# Patient Record
Sex: MALE | Race: ASIAN | Employment: FULL TIME | URBAN - METROPOLITAN AREA
[De-identification: names, ages, dates, MRNs, and addresses within clinical notes are randomized per-mention and may not be internally consistent; named-entity substitution may affect disease eponyms.]

---

## 2020-02-17 ENCOUNTER — OFFICE VISIT (OUTPATIENT)
Dept: HEMATOLOGY | Age: 55
End: 2020-02-17

## 2020-02-17 ENCOUNTER — HOSPITAL ENCOUNTER (OUTPATIENT)
Dept: LAB | Age: 55
Discharge: HOME | End: 2020-02-17
Payer: OTHER GOVERNMENT

## 2020-02-17 VITALS
WEIGHT: 184.8 LBS | SYSTOLIC BLOOD PRESSURE: 127 MMHG | DIASTOLIC BLOOD PRESSURE: 89 MMHG | OXYGEN SATURATION: 99 % | TEMPERATURE: 98 F | HEART RATE: 67 BPM | RESPIRATION RATE: 18 BRPM | BODY MASS INDEX: 27.37 KG/M2 | HEIGHT: 69 IN

## 2020-02-17 DIAGNOSIS — K76.0 FATTY LIVER: ICD-10-CM

## 2020-02-17 DIAGNOSIS — K76.0 FATTY LIVER: Primary | ICD-10-CM

## 2020-02-17 PROBLEM — G62.9 NEUROPATHY: Status: ACTIVE | Noted: 2020-02-17

## 2020-02-17 PROBLEM — E78.00 HYPERCHOLESTEROLEMIA: Status: ACTIVE | Noted: 2020-02-17

## 2020-02-17 PROBLEM — E11.9 DIABETES MELLITUS TYPE 2, CONTROLLED (HCC): Status: ACTIVE | Noted: 2020-02-17

## 2020-02-17 PROBLEM — I51.7 CARDIOMEGALY: Status: ACTIVE | Noted: 2020-02-17

## 2020-02-17 PROBLEM — M45.9 ANKYLOSING SPONDYLITIS (HCC): Status: ACTIVE | Noted: 2020-02-17

## 2020-02-17 LAB
ALBUMIN SERPL-MCNC: 4.4 G/DL (ref 3.4–5)
ALBUMIN/GLOB SERPL: 1 {RATIO} (ref 0.8–1.7)
ALP SERPL-CCNC: 38 U/L (ref 45–117)
ALT SERPL-CCNC: 75 U/L (ref 16–61)
ANION GAP SERPL CALC-SCNC: 3 MMOL/L (ref 3–18)
AST SERPL-CCNC: 46 U/L (ref 10–38)
BASOPHILS # BLD: 0.1 K/UL (ref 0–0.1)
BASOPHILS NFR BLD: 2 % (ref 0–2)
BILIRUB DIRECT SERPL-MCNC: 0.1 MG/DL (ref 0–0.2)
BILIRUB SERPL-MCNC: 0.4 MG/DL (ref 0.2–1)
BUN SERPL-MCNC: 19 MG/DL (ref 7–18)
BUN/CREAT SERPL: 16 (ref 12–20)
CALCIUM SERPL-MCNC: 9.6 MG/DL (ref 8.5–10.1)
CHLORIDE SERPL-SCNC: 104 MMOL/L (ref 100–111)
CO2 SERPL-SCNC: 33 MMOL/L (ref 21–32)
CREAT SERPL-MCNC: 1.18 MG/DL (ref 0.6–1.3)
DIFFERENTIAL METHOD BLD: ABNORMAL
EOSINOPHIL # BLD: 0.3 K/UL (ref 0–0.4)
EOSINOPHIL NFR BLD: 5 % (ref 0–5)
ERYTHROCYTE [DISTWIDTH] IN BLOOD BY AUTOMATED COUNT: 13.3 % (ref 11.6–14.5)
FERRITIN SERPL-MCNC: 141 NG/ML (ref 8–388)
GLOBULIN SER CALC-MCNC: 4.2 G/DL (ref 2–4)
GLUCOSE SERPL-MCNC: 74 MG/DL (ref 74–99)
HCT VFR BLD AUTO: 47.3 % (ref 36–48)
HGB BLD-MCNC: 15.9 G/DL (ref 13–16)
INR PPP: 1 (ref 0.8–1.2)
IRON SATN MFR SERPL: 29 % (ref 20–50)
IRON SERPL-MCNC: 116 UG/DL (ref 50–175)
LYMPHOCYTES # BLD: 2.7 K/UL (ref 0.9–3.6)
LYMPHOCYTES NFR BLD: 48 % (ref 21–52)
MCH RBC QN AUTO: 29.9 PG (ref 24–34)
MCHC RBC AUTO-ENTMCNC: 33.6 G/DL (ref 31–37)
MCV RBC AUTO: 88.9 FL (ref 74–97)
MONOCYTES # BLD: 0.3 K/UL (ref 0.05–1.2)
MONOCYTES NFR BLD: 6 % (ref 3–10)
NEUTS SEG # BLD: 2.2 K/UL (ref 1.8–8)
NEUTS SEG NFR BLD: 39 % (ref 40–73)
PLATELET # BLD AUTO: 286 K/UL (ref 135–420)
PMV BLD AUTO: 9.9 FL (ref 9.2–11.8)
POTASSIUM SERPL-SCNC: 4.2 MMOL/L (ref 3.5–5.5)
PROT SERPL-MCNC: 8.6 G/DL (ref 6.4–8.2)
PROTHROMBIN TIME: 12.9 SEC (ref 11.5–15.2)
RBC # BLD AUTO: 5.32 M/UL (ref 4.7–5.5)
SODIUM SERPL-SCNC: 140 MMOL/L (ref 136–145)
TIBC SERPL-MCNC: 394 UG/DL (ref 250–450)
WBC # BLD AUTO: 5.5 K/UL (ref 4.6–13.2)

## 2020-02-17 PROCEDURE — 86708 HEPATITIS A ANTIBODY: CPT

## 2020-02-17 PROCEDURE — 80076 HEPATIC FUNCTION PANEL: CPT

## 2020-02-17 PROCEDURE — 36415 COLL VENOUS BLD VENIPUNCTURE: CPT

## 2020-02-17 PROCEDURE — 85025 COMPLETE CBC W/AUTO DIFF WBC: CPT

## 2020-02-17 PROCEDURE — 86706 HEP B SURFACE ANTIBODY: CPT

## 2020-02-17 PROCEDURE — 86704 HEP B CORE ANTIBODY TOTAL: CPT

## 2020-02-17 PROCEDURE — 80048 BASIC METABOLIC PNL TOTAL CA: CPT

## 2020-02-17 PROCEDURE — 87340 HEPATITIS B SURFACE AG IA: CPT

## 2020-02-17 PROCEDURE — 83540 ASSAY OF IRON: CPT

## 2020-02-17 PROCEDURE — 85610 PROTHROMBIN TIME: CPT

## 2020-02-17 PROCEDURE — 82728 ASSAY OF FERRITIN: CPT

## 2020-02-17 PROCEDURE — 86803 HEPATITIS C AB TEST: CPT

## 2020-02-17 PROCEDURE — 82107 ALPHA-FETOPROTEIN L3: CPT

## 2020-02-17 RX ORDER — GABAPENTIN 600 MG/1
600 TABLET ORAL 2 TIMES DAILY
COMMUNITY

## 2020-02-17 RX ORDER — FENOFIBRATE 150 MG/1
145 CAPSULE ORAL
COMMUNITY

## 2020-02-17 NOTE — PROGRESS NOTES
[address] [facility] MD Mccall Derwood Sorrel, MD Garlan Mattock, PA-C Louetta Peace, [facility]     Latasha Aguilar, [facility]   Juan J Joseph BEVERLY-RICARDA Wang, [facility]       Ramona Deputado Galile[address] 136    at [facility]    [address], [address] [facility]    [address] [facility] 22.    [phone]    FAX: [address], [address]    [phone]    FAX: [phone]         Patient Care Team:  Ngozi Rodriguez DO as PCP - General (Internal Medicine)      Problem List  Date Reviewed: 2/17/2020          Codes Class Noted    Fatty liver ICD-10-CM: K76.0  ICD-9-CM: 571.8  2/17/2020        Hypercholesterolemia ICD-10-CM: E78.00  ICD-9-CM: 272.0  2/17/2020        Diabetes mellitus type 2, controlled ([facility] ICD-10-CM: E11.9  ICD-9-CM: 250.00  2/17/2020        Cardiomegaly ICD-10-CM: I51.7  ICD-9-CM: 429.3  2/17/2020        Neuropathy ICD-10-CM: G62.9  ICD-9-CM: 355.9  2/17/2020        Ankylosing spondylitis [facility]) ICD-10-CM: M45.9  ICD-9-CM: 720.0  2/17/2020                The clinicians listed above have asked me to see Alexi Berlin in consultation regarding suspected fatty liver disease and its management. All medical records sent by the referring physicians were reviewed including imaging studies. The patient is a 53 y.o.  male who is suspected to have fatty liver disease based upon MRI. The most recent laboratory studies are not available. Serologic evaluation for markers of chronic liver disease has either not been performed or the results are not available. The most recent imaging of the liver was MRI performed in 11/2019. Results suggest fatty liver disease. No liver mass lesions noted.     An assessment of liver fibrosis with biopsy or elastography has not been performed. The patient has no symptoms which can be attributed to the liver disorder. The patient completes all daily activities without any functional limitations. The patient has not experienced fatigue, fevers, chills, shortness of breath, chest pain, pain in the right side over the liver, diffuse abdominal pain, nausea, vomiting, constipation, diarrhrea, dry eyes, dry mouth, arthralgias, myalgias, yellowing of the eyes or skin, itching, dark urine, problems concentrating, swelling of the abdomen, swelling of the lower extremities, hematemesis, or hematochezia. ASSESSMENT AND PLAN:  Fatty liver  The diagnosis is based upon imaging and features of metabolic syndrome. The histologic severity has not been defined. Will perform laboratory testing to monitor liver function and degree of liver injury. Serologic testing for causes of chronic liver disease were ordered. Only a liver biopsy can confirm if the patient does have fatty liver and differentiate NAFL from VALENTINE. The treatment is the same: weight reduction. The need to perform a liver biopsy to help determine the cause and severity of the liver test abnormalities was discussed. The risks of performing the liver biopsy including pain, puncture of the lung, gallbladder, intestine or kidney and bleeding were discussed. Will defer liver biopsy for now. Liver chemistries will be monitored. If the liver enzymes remain persistently elevated over the next 1-2 years a liver biopsy should be performed to ensure there is no ongoing chronic liver disease. If the patient looses 20% of current body weight all steatosis will have resolved. Once all steatosis has resolved all inflammation will resolve.   Then all fibrosis will gradually resolve and the liver could eventually be normal.    There is currently no FDA approved medical treatment for fatty liver, NALFD or VALENTINE. The only medical treatments for VALENTINE are though clinical trials. The patient would be a good candidate for enrollment into a clinical trial if found to have VALENTINE. The need to perform an assessment of liver fibrosis was discussed with the patient. Fibroscan can assess liver fibrosis and determine if a patient has advanced fibrosis or cirrhosis without the need for liver biopsy. This can be performed in the office at his follow up visit in 4 weeks. The Fibroscan can be repeated annually or as often as clinically indicated to assess for progression or regression of fibrosis. If liver enzymes do not return to normal with weight reduction then additional evaluation including liver biopsy may be necessary to determine if the elevated liver enzymes is due to another etiology. Counseling for diet and weight loss in patients with confirmed or suspected NAFLD  The patient was counseled regarding diet and exercise to achieve weight loss. The best diet for patients with fatty liver is one very low in carbohydrates and enriched with protein such as an Jalyn's program.      The patient was told not to consume any food products and drinks containing fructose as this enhances hepatic fat synthesis. There is no medication or vitamin supplements that we advocate for VALENTINE. Using glitazones in patients without diabetes mellitus has been shown to reduce fat content in the liver but has no effect on fibrosis and is associated with weight gain. Vitamin E has also been used but the data is not very good and most experts no longer advocate this. Screening for Hepatocellular Carcinoma  HCC screening is not necessary if the patient has no evidence of cirrhosis. AFP was ordered today.       Treatment of other medical problems in patients with chronic liver disease  There are no contraindications for the patient to take most medications that are necessary for treatment of other medical issues. The patient does not comsume alcohol on a daily basis. Normal doses of acetaminophen, as recommended on the label of the bottle, are not hepatotoxic except in the setting of daily alcohol use, even in patients with cirrhosis and can be utilized for pain. Counseling for alcohol in patients with chronic liver disease  The patient was counseled regarding alcohol consumption and the effect of alcohol on chronic liver disease. The patient does not consume any significant amount of alcohol. Vaccinations   The need for vaccination against viral hepatitis A and B will be assessed with serologic and instituted as appropriate. Routine vaccinations against other bacterial and viral agents can be performed as indicated. Annual flu vaccination should be administered if indicated. ALLERGIES  No Known Allergies    MEDICATIONS  Current Outpatient Medications   Medication Sig    inFLIXimab-abda (RENFLEXIS) 100 mg solr injection by IntraVENous route.  sitagliptin phos/metformin HCl (JANUMET PO) Take  by mouth.  atorvastatin calcium (LIPITOR PO) Take 80 mg by mouth daily.  glimepiride (AMARYL PO) Take  by mouth.  gabapentin (NEURONTIN) 600 mg tablet Take 600 mg by mouth two (2) times a day.  topiramate (TOPAMAX PO) Take  by mouth.  rizatriptan benzoate (MAXALT PO) Take  by mouth.  docosahexanoic acid/epa (FISH OIL PO) Take  by mouth.  calcium carbonate (CALCIUM 600 PO) Take  by mouth.  cholecalciferol, vitamin D3, (VITAMIN D3 PO) Take  by mouth.  Fenofibrate 150 mg cap Take 145 mg by mouth. No current facility-administered medications for this visit. SYSTEM REVIEW NOT RELATED TO LIVER DISEASE OR REVIEWED ABOVE:  Constitution systems: Negative for fever, chills, weight gain, weight loss. Eyes: Negative for visual changes. ENT: Negative for sore throat, painful swallowing. Respiratory: Negative for cough, hemoptysis, SOB.    Cardiology: Negative for chest pain, palpitations. GI:  Negative for constipation or diarrhea. : Negative for urinary frequency, dysuria, hematuria, nocturia. Skin: Negative for rash. Hematology: Negative for easy bruising, blood clots. Musculo-skelatal: Negative for back pain, muscle pain, weakness. Neurologic: Negative for headaches, dizziness, vertigo, memory problems not related to HE. Psychology: Negative for anxiety, depression. FAMILY HISTORY:  The father  of aneurism at age 76. The mother has the following chronic disease(s): DM, AD, hypercholesterolemia. There is no family history of liver disease. There is no family history of immune disorders. SOCIAL HISTORY:  The patient is . The patient has 2 children. The patient currently smokes 1 cigar weekly. The patient consumes alcohol on social occasions never in excess. The patient currently works full time as naval . PHYSICAL EXAMINATION:  VS: per nursing note  General: No acute distress. Eyes: Sclera anicteric. ENT: No oral lesions. Thyroid normal.  Nodes: No adenopathy. Skin: No spider angiomata. No jaundice. No palmar erythema. Respiratory: Lungs clear to auscultation. Cardiovascular: Regular heart rate. No murmurs. No JVD. Abdomen: Soft non-tender, liver size normal to percussion/palpation. Spleen not palpable. No obvious ascites. Extremities: No edema. No muscle wasting. No gross arthritic changes. Neurologic: Alert and oriented. Cranial nerves grossly intact. No asterixis. LABORATORY STUDIES:  Recent liver function panel, CBC with platelet count and BMP are not available. These studies will be performed. SEROLOGIES:  Not available or performed. Testing was performed today. LIVER HISTOLOGY:  Not available or performed    ENDOSCOPIC PROCEDURES:  Not available or performed    RADIOLOGY:  2019. [facility] the abdomen ([facility]). Consistent with fatty liver. No hepatic masses. OTHER TESTING:  Not available or performed    FOLLOW-UP:  All of the issues listed above in the Assessment and Plan were discussed with the patient. All questions were answered. The patient expressed a clear understanding of the above. [address] in 4 weeks for Fibroscan and to review all data and determine the treatment plan.       Raudel Herman, SADNORP-C  Liver [place] of [facility]  [address] [facility], [address] [place] [address], [address] [place] [address]   [phone]

## 2020-02-18 LAB
AFP L3 MFR SERPL: NORMAL % (ref 0–9.9)
AFP SERPL-MCNC: 2.3 NG/ML (ref 0–8)
COMMENT, 144067: NORMAL
HAV AB SER QL IA: POSITIVE
HBV CORE AB SERPL QL IA: NEGATIVE
HBV SURFACE AB SER QL IA: NEGATIVE
HBV SURFACE AB SERPL IA-ACNC: <3.1 MIU/ML
HBV SURFACE AG SER QL: <0.1 INDEX
HBV SURFACE AG SER QL: NEGATIVE
HCV AB S/CO SERPL IA: <0.1 S/CO RATIO (ref 0–0.9)
HEP BS AB COMMENT,HBSAC: ABNORMAL

## 2020-03-21 ENCOUNTER — NURSE TRIAGE (OUTPATIENT)
Dept: OTHER | Facility: CLINIC | Age: 55
End: 2020-03-21

## 2020-03-21 NOTE — TELEPHONE ENCOUNTER
Reason for Disposition   Health Information question, no triage required and triager able to answer question    Protocols used: INFORMATION ONLY CALL-ADULT-    Caller is wanting to know if he should reschedule his appointment for 3/24. Caller does report a mild scratchy throat, denies any other symptoms. Caller is going to [facility] back on Monday to make sure he is symptom free before coming into the office.

## 2020-08-31 ENCOUNTER — VIRTUAL VISIT (OUTPATIENT)
Dept: HEMATOLOGY | Age: 55
End: 2020-08-31

## 2020-08-31 DIAGNOSIS — R74.8 ELEVATED LIVER ENZYMES: Primary | ICD-10-CM

## 2020-08-31 NOTE — PROGRESS NOTES
VIRTUAL TELEHEALTH VISIT PERFORMED DUE TO COVID-19 EPIDEMIC    CONSENT:  Kecia Vega, who was seen by synchronous, real-time, audio-video technology, and/or his healthcare decision maker, is aware that this patient-initiated, Telehealth encounter on 8/31/2020 is a billable service, with coverage as determined by his insurance carrier. He is aware that he may receive a bill and has provided verbal consent to proceed. This patient was evaluated during a Virtual Telehealth visit. A caregiver was present if appropriate.  Due to this being a TeleHealth encounter performed during the WURED-67 public health emergency, the physical examination was limited to that listed in the [address] [place] [address], Maikel CARLSON, Nabeel Hagen, MD Michelle Clark, PACH[place]AN [place], [facility] [address] Jeff, [facility]   Sundar Kee, [facility] [address], [facility]       Ramona [facility] 136    at [address], [address] [place] [address], [facility] 22.    [phone]    FAX: [phone] [facility]    at [address], [address]    [phone]    FAX: [phone]         Patient Care Team:  Jewell Hicks DO as PCP - General (Internal Medicine)      Problem List  Date Reviewed: 2/17/2020          Codes Class Noted    Fatty liver ICD-10-CM: K76.0  ICD-9-CM: 571.8  2/17/2020        Hypercholesterolemia ICD-10-CM: E78.00  ICD-9-CM: 272.0  2/17/2020        Diabetes mellitus type 2, controlled ([facility] ICD-10-CM: E11.9  ICD-9-CM: 250.00  2/17/2020        Cardiomegaly ICD-10-CM: I51.7  ICD-9-CM: 429.3  2/17/2020        Neuropathy ICD-10-CM: G62.9  ICD-9-CM: 355.9  2/17/2020        Ankylosing spondylitis ([facility] ICD-10-CM: M45.9  ICD-9-CM: 720.0  2/17/2020 Casie Tadeo is being seen at [place] [address] for management of suspected fatty liver. The active problem list, all pertinent past medical history, medications, radiologic findings and laboratory findings related to the liver disorder were reviewed with the patient. The patient is a 53 y.o.  male who is suspected to have fatty liver disease based upon MRI in 12/2019. Serologic evaluation for markers of chronic liver disease was negative. The most recent imaging of the liver was MRI performed in 12/2019. Results suggest fatty liver disease. No liver mass lesions noted. An assessment of liver fibrosis with biopsy or elastography has not been performed. The patient has no symptoms which can be attributed to the liver disorder. The patient is not currently experiencing the following symptoms of liver disease:  fatigue, pain in the right side over the liver,     The patient completes all daily activities without any functional limitations. ASSESSMENT AND PLAN:  Fatty liver  The diagnosis is based upon imaging and features of metabolic syndrome. The histologic severity has not been defined. Liver transaminases are elevated. ALP is normal.  Liver function is normal.  The platelet count is   normal.      Based upon laboratory studies and imaging the patient does not appear to have significant liver injury. Serologic testing for causes of chronic liver disease was negative. The need to perform an assessment of liver fibrosis was discussed with the patient. The Fibroscan can assess liver fibrosis and determine if a patient has advanced fibrosis or cirrhosis without the need for liver biopsy. This will be performed at the next office visit. If the Fibroscan suggests advanced fibrosis then a liver biopsy should be considered.       The Fibroscan can be repeated annually or as often as clinically indicated to assess for fibrosis progression and/or regression. Screening for Hepatocellular Carcinoma  HCC screening is not necessary if the patient has no evidence of cirrhosis. Treatment of other medical problems in patients with chronic liver disease  There are no contraindications for the patient to take most medications that are necessary for treatment of other medical issues. Counseling for alcohol in patients with chronic liver disease  The patient was counseled regarding alcohol consumption and the effect of alcohol on chronic liver disease. The patient does not consume any significant amount of alcohol. Vaccinations   Vaccination for viral hepatitis B is recommended since the patient has no serologic evidence of previous exposure or vaccination with immunity. Vaccination for viral hepatitis A is not needed. The patient has serologic evidence of prior exposure or vaccination with immunity. Routine vaccinations against other bacterial and viral agents can be performed as indicated. Annual flu vaccination should be administered if indicated. ALLERGIES  No Known Allergies    MEDICATIONS  Current Outpatient Medications   Medication Sig    inFLIXimab-abda (RENFLEXIS) 100 mg solr injection by IntraVENous route.  sitagliptin phos/metformin HCl (JANUMET PO) Take  by mouth.  atorvastatin calcium (LIPITOR PO) Take 80 mg by mouth daily.  glimepiride (AMARYL PO) Take  by mouth.  gabapentin (NEURONTIN) 600 mg tablet Take 600 mg by mouth two (2) times a day.  topiramate (TOPAMAX PO) Take  by mouth.  rizatriptan benzoate (MAXALT PO) Take  by mouth.  docosahexanoic acid/epa (FISH OIL PO) Take  by mouth.  calcium carbonate (CALCIUM 600 PO) Take  by mouth.  cholecalciferol, vitamin D3, (VITAMIN D3 PO) Take  by mouth.  Fenofibrate 150 mg cap Take 145 mg by mouth. No current facility-administered medications for this visit.         SYSTEM REVIEW NOT RELATED TO LIVER DISEASE OR REVIEWED ABOVE:  Constitution systems: Negative for fever, chills, weight gain, weight loss. Eyes: Negative for visual changes. ENT: Negative for sore throat, painful swallowing. Respiratory: Negative for cough, hemoptysis, SOB. Cardiology: Negative for chest pain, palpitations. GI:  Negative for constipation or diarrhea. : Negative for urinary frequency, dysuria, hematuria, nocturia. Skin: Negative for rash. Hematology: Negative for easy bruising, blood clots. Musculo-skelatal: Negative for back pain, muscle pain, weakness. Neurologic: Negative for headaches, dizziness, vertigo, memory problems not related to HE. Psychology: Negative for anxiety, depression. FAMILY HISTORY:  The father  of aneurism at age 76. The mother has the following chronic disease(s): DM, AD, hypercholesterolemia. There is no family history of liver disease. There is no family history of immune disorders. SOCIAL HISTORY:  The patient is . The patient has 2 children. The patient currently smokes 1 cigar weekly. The patient consumes alcohol on social occasions never in excess. The patient currently works full time as naval . PHYSICAL EXAMINATION PERFORMED BY VIRTUAL TELEHEALTH:  VS: Not performed   General: No acute distress. Eyes: Sclera anicteric. ENT: No oral lesions. Skin: No rashes. spider angiomata. No jaundice. Abdomen: No obvious distention suggesting ascites. Extremities: No edema. No muscle wasting. Neurologic: Alert and oriented. Cranial nerves grossly intact.       LABORATORY STUDIES:  Liver [place] of [address] Units 2020   WBC 4.6 - 13.2 K/uL 5.5   ANC 1.8 - 8.0 K/UL 2.2   HGB 13.0 - 16.0 g/dL 15.9    - 420 K/uL 286   INR 0.8 - 1.2   1.0   AST 10 - 38 U/L 46 (H)   ALT 16 - 61 U/L 75 (H)   Alk Phos 45 - 117 U/L 38 (L)   Bili, Total 0.2 - 1.0 MG/DL 0.4   Bili, Direct 0.0 - 0.2 MG/DL 0.1   Albumin 3.4 - 5.0 g/dL 4.4   BUN 7.0 - 18 MG/DL 19 (H)   Creat 0.6 - 1.3 MG/DL 1.18   Na 136 - 145 mmol/L 140   K 3.5 - 5.5 mmol/L 4.2   Cl 100 - 111 mmol/L 104   CO2 21 - 32 mmol/L 33 (H)   Glucose 74 - 99 mg/dL 74     Cancer Screening Latest Ref Rng & Units 2/17/2020   AFP, Serum 0.0 - 8.0 ng/mL 2.3     SEROLOGIES:  Serologies Latest Ref Rng & Units 2/17/2020   Hep A Ab, Total NEGATIVE   Positive (A)   Hep B Surface Ag <1.00 Index <0.10   Hep B Surface Ag Interp NEG   NEGATIVE   Hep B Core Ab, Total NEGATIVE   NEGATIVE   Hep B Surface Ab >10.0 mIU/mL <3.10 (L)   Hep B Surface Ab Interp POS   NEGATIVE (A)   Hep C Ab 0.0 - 0.9 s/co ratio <0.1   Ferritin 8 - 388 NG/   Iron % Saturation 20 - 50 % 29     LIVER HISTOLOGY:  Not available or performed    ENDOSCOPIC PROCEDURES:  Not available or performed    RADIOLOGY:  12/2019. MRI of the abdomen ([facility]). Consistent with fatty liver. No hepatic masses. OTHER TESTING:  Not available or performed    FOLLOW-UP AFTER VIRTUAL VISIT:  Pursuant to the emergency declaration under the [facility], [facility] waiver authority and the [employer] Act, this Virtual  Visit was conducted, with the patient's (and/or their legal guardian's) consent, to reduce the patient's risk of exposure to COVID-19 and provide necessary medical care. Services were provided through a video synchronous discussion virtually to substitute for an in-person clinic visit. The patient was located in their home. The provider was located in the [employer] office. All of the issues listed above in the Assessment and Plan were discussed with the patient. All questions were answered. The patient expressed a clear understanding of the above. An in-person follow-up visit will be scheduled at [place] [address] in 4 weeks for Fibroscan to review all data and determine the treatment plan.       Radha Miller MD  [address] [place] [address] Liver [place] of [address] [place] [address] Road,[address]  [address], [address] [place] [address] Briana Gonzales, [address]  [address] [facility]

## 2020-10-06 ENCOUNTER — OFFICE VISIT (OUTPATIENT)
Dept: HEMATOLOGY | Age: 55
End: 2020-10-06
Payer: OTHER GOVERNMENT

## 2020-10-06 VITALS
OXYGEN SATURATION: 98 % | SYSTOLIC BLOOD PRESSURE: 120 MMHG | HEART RATE: 74 BPM | TEMPERATURE: 98.8 F | BODY MASS INDEX: 26.99 KG/M2 | WEIGHT: 182.25 LBS | DIASTOLIC BLOOD PRESSURE: 76 MMHG | HEIGHT: 69 IN

## 2020-10-06 DIAGNOSIS — R74.8 ELEVATED LIVER ENZYMES: Primary | ICD-10-CM

## 2020-10-06 PROCEDURE — 99214 OFFICE O/P EST MOD 30 MIN: CPT | Performed by: NURSE PRACTITIONER

## 2020-10-06 PROCEDURE — 91200 LIVER ELASTOGRAPHY: CPT | Performed by: NURSE PRACTITIONER

## 2020-10-06 NOTE — PROGRESS NOTES
[address] \[facility]\"", MD, MD Gayatri Pepe, VALDO Villagomez, [facility]     April DUANE Aguilar, [facility]   Bartolo Vita, MARYJO Boucher, [facility]       Ramona [facility] 136    at [address], [address] [place] [address] 22.    [phone]    FAX: [address] [place] [address], [address]    [phone]    FAX: [phone]         Patient Care Team:  Ganga Land DO as PCP - General (Internal Medicine)      Problem List  Date Reviewed: 9/7/2020          Codes Class Noted    Fatty liver ICD-10-CM: K76.0  ICD-9-CM: 571.8  2/17/2020        Hypercholesterolemia ICD-10-CM: E78.00  ICD-9-CM: 272.0  2/17/2020        Diabetes mellitus type 2, controlled ([facility] [address] ICD-10-CM: E11.9  ICD-9-CM: 250.00  2/17/2020        Cardiomegaly ICD-10-CM: I51.7  ICD-9-CM: 429.3  2/17/2020        Neuropathy ICD-10-CM: G62.9  ICD-9-CM: 355.9  2/17/2020        Ankylosing spondylitis ([facility] [place] ICD-10-CM: M45.9  ICD-9-CM: 720.0  2/17/2020              Mendel Espino returns to the [place] [id number] today for fibroscan assessment of hepatic fibrosis and for education and management of suspected fatty liver disease. The active problem list, all pertinent past medical history, medications, liver histology, endoscopic studies, radiologic findings and laboratory findings related to the liver disorder were reviewed with the patient. The patient is a 63 y.o.  male who is suspected to have fatty liver disease based upon MRI.       The most recent laboratory studies indicate that the liver transaminases are elevated, alkaline phosphatase is normal, tests of hepatic synthetic and metabolic function are   normal, and the platelet count is normal.      Serologic evaluation was negative for all causes of chronic liver disease. The most recent imaging of the liver was MRI performed in 11/2019. Results suggest fatty liver disease. No liver mass lesions noted. An assessment of liver fibrosis with fibroscan was performed in the office today. Results of the scan indicate normal liver stiffness, Metavir F0, and fatty liver. The patient has no symptoms which can be attributed to the liver disorder. The patient completes all daily activities without any functional limitations. The patient has not experienced fatigue, fevers, chills, shortness of breath, chest pain, pain in the right side over the liver, diffuse abdominal pain, nausea, vomiting, constipation, diarrhrea, dry eyes, dry mouth, arthralgias, myalgias, yellowing of the eyes or skin, itching, dark urine, problems concentrating, swelling of the abdomen, swelling of the lower extremities, hematemesis, or hematochezia. Since the last office appointment:  Had no changes in liver disease. Has lost about 10 pounds. ASSESSMENT AND PLAN:  Fatty liver  The diagnosis is based upon imaging, fibroscan CAP score of 323, and features of metabolic syndrome. The most recent laboratory studies indicate that the liver transaminases are elevated, alkaline phosphatase is normal, tests of hepatic synthetic and metabolic function are   normal, and the platelet count is normal.      Serologic evaluation was negative for all causes of chronic liver disease. Only a liver biopsy can confirm if the patient does have fatty liver and differentiate NAFL from VALENTINE. The treatment is the same: weight reduction. The need to perform a liver biopsy to help determine the cause and severity of the liver test abnormalities was discussed. The risks of performing the liver biopsy including pain, puncture of the lung, gallbladder, intestine or kidney and bleeding were discussed. Will defer liver biopsy for now. Liver chemistries will be monitored. If the liver enzymes remain persistently elevated over the next 1-2 years a liver biopsy should be performed to ensure there is no ongoing chronic liver disease. If the patient looses 20% of current body weight all steatosis will have resolved. Once all steatosis has resolved all inflammation will resolve. Then all fibrosis will gradually resolve and the liver could eventually be normal.    There is currently no FDA approved medical treatment for fatty liver, NALFD or VALENTINE. The only medical treatments for VALENTINE are though clinical trials. The patient would be a good candidate for enrollment into a clinical trial if found to have VALENTINE. The need to perform an assessment of liver fibrosis was discussed with the patient. Fibroscan can assess liver fibrosis and determine if a patient has advanced fibrosis or cirrhosis without the need for liver biopsy. This was performed in th office today. Results of the scan indicate no liver damage, but some fatty liver. The Fibroscan can be repeated annually or as often as clinically indicated to assess for progression or regression of fibrosis. If liver enzymes do not return to normal with weight reduction then additional evaluation including liver biopsy may be necessary to determine if the elevated liver enzymes is due to another etiology. Counseling for diet and weight loss in patients with confirmed or suspected NAFLD  The patient was counseled regarding diet and exercise to achieve weight loss. The best diet for patients with fatty liver is one very low in carbohydrates and enriched with protein such as an Jalyn's program.      The patient was told not to consume any food products and drinks containing fructose as this enhances hepatic fat synthesis. There is no medication or vitamin supplements that we advocate for VALENTINE.     Using glitazones in patients without diabetes mellitus has been shown to reduce fat content in the liver but has no effect on fibrosis and is associated with weight gain. Vitamin E has also been used but the data is not very good and most experts no longer advocate this. Screening for Hepatocellular Carcinoma  HCC screening is not necessary if the patient has no evidence of cirrhosis. Treatment of other medical problems in patients with chronic liver disease  There are no contraindications for the patient to take most medications that are necessary for treatment of other medical issues. The patient does not comsume alcohol on a daily basis. Normal doses of acetaminophen, as recommended on the label of the bottle, are not hepatotoxic except in the setting of daily alcohol use, even in patients with cirrhosis and can be utilized for pain. Counseling for alcohol in patients with chronic liver disease  The patient was counseled regarding alcohol consumption and the effect of alcohol on chronic liver disease. The patient does not consume any significant amount of alcohol. Vaccinations   Vaccination for viral hepatitis A is not required. The patient has serologic evidence of prior exposure or vaccination with immunity. Vaccination for viral hepatitis B is recommended. The patient does not have serologic evidence of prior exposure or vaccination with immunity. Routine vaccinations against other bacterial and viral agents can be performed as indicated. Annual flu vaccination should be administered if indicated. ALLERGIES  No Known Allergies    MEDICATIONS  Current Outpatient Medications   Medication Sig    inFLIXimab-abda (RENFLEXIS) 100 mg solr injection by IntraVENous route.  sitagliptin phos/metformin HCl (JANUMET PO) Take  by mouth.  atorvastatin calcium (LIPITOR PO) Take 80 mg by mouth daily.  glimepiride (AMARYL PO) Take  by mouth.  gabapentin (NEURONTIN) 600 mg tablet Take 600 mg by mouth two (2) times a day.     topiramate (TOPAMAX PO) Take  by mouth.  rizatriptan benzoate (MAXALT PO) Take  by mouth.  docosahexanoic acid/epa (FISH OIL PO) Take  by mouth.  calcium carbonate (CALCIUM 600 PO) Take  by mouth.  cholecalciferol, vitamin D3, (VITAMIN D3 PO) Take  by mouth.  Fenofibrate 150 mg cap Take 145 mg by mouth. No current facility-administered medications for this visit. SYSTEM REVIEW NOT RELATED TO LIVER DISEASE OR REVIEWED ABOVE:  Constitution systems: Negative for fever, chills, weight gain, weight loss. Eyes: Negative for visual changes. ENT: Negative for sore throat, painful swallowing. Respiratory: Negative for cough, hemoptysis, SOB. Cardiology: Negative for chest pain, palpitations. GI:  Negative for constipation or diarrhea. : Negative for urinary frequency, dysuria, hematuria, nocturia. Skin: Negative for rash. Hematology: Negative for easy bruising, blood clots. Musculo-skelatal: Negative for back pain, muscle pain, weakness. Neurologic: Negative for headaches, dizziness, vertigo, memory problems not related to HE. Psychology: Negative for anxiety, depression. FAMILY HISTORY:  The father  of aneurism at age 76. The mother has the following chronic disease(s): DM, AD, hypercholesterolemia. There is no family history of liver disease. There is no family history of immune disorders. SOCIAL HISTORY:  The patient is . The patient has 2 children. The patient currently smokes 1 cigar weekly. The patient consumes alcohol on social occasions never in excess. The patient currently works full time as naval . PHYSICAL EXAMINATION:  VS: per nursing note  General: No acute distress. Eyes: Sclera anicteric. ENT: No oral lesions. Thyroid normal.  Nodes: No adenopathy. Skin: No spider angiomata. No jaundice. No palmar erythema. Respiratory: Lungs clear to auscultation. Cardiovascular: Regular heart rate.   No murmurs. No JVD. Abdomen: Soft non-tender, liver size normal to percussion/palpation. Spleen not palpable. No obvious ascites. Extremities: No edema. No muscle wasting. No gross arthritic changes. Neurologic: Alert and oriented. Cranial nerves grossly intact. No asterixis. LABORATORY STUDIES:  From 10/01/2020 [facility]): AST/ ALT/ ALP/ T. BILI/ ALB: 45/ 59/ 38/ 0.4/ 4.8  BUN/ CRT/ PLT:  25/ 1.30/ 277,000. Liver Dietrich [address] Ref Rng & Units 2/17/2020   WBC 4.6 - 13.2 K/uL 5.5   ANC 1.8 - 8.0 K/UL 2.2   HGB 13.0 - 16.0 g/dL 15.9    - 420 K/uL 286   INR 0.8 - 1.2   1.0   AST 10 - 38 U/L 46 (H)   ALT 16 - 61 U/L 75 (H)   Alk Phos 45 - 117 U/L 38 (L)   Bili, Total 0.2 - 1.0 MG/DL 0.4   Bili, Direct 0.0 - 0.2 MG/DL 0.1   Albumin 3.4 - 5.0 g/dL 4.4   BUN 7.0 - 18 MG/DL 19 (H)   Creat 0.6 - 1.3 MG/DL 1.18   Na 136 - 145 mmol/L 140   K 3.5 - 5.5 mmol/L 4.2   Cl 100 - 111 mmol/L 104   CO2 21 - 32 mmol/L 33 (H)   Glucose 74 - 99 mg/dL 74     SEROLOGIES:  Serologies Latest Ref Rng & Units 2/17/2020   Hep A Ab, Total NEGATIVE   Positive (A)   Hep B Surface Ag <1.00 Index <0.10   Hep B Surface Ag Interp NEG   NEGATIVE   Hep B Core Ab, Total NEGATIVE   NEGATIVE   Hep B Surface Ab >10.0 mIU/mL <3.10 (L)   Hep B Surface Ab Interp POS   NEGATIVE (A)   Hep C Ab 0.0 - 0.9 s/co ratio <0.1   Ferritin 8 - 388 NG/   Iron % Saturation 20 - 50 % 29       LIVER HISTOLOGY:  10/2020. [facility]. EkPa was 5.4. IQR/med 25%. . The results suggested a fibrosis level of F0. The CAP score suggests fatty liver. ENDOSCOPIC PROCEDURES:  Not available or performed    RADIOLOGY:  12/2019. MRI of the abdomen ([facility]). Consistent with fatty liver. No hepatic masses. OTHER TESTING:  Not available or performed    FOLLOW-UP:  All of the issues listed above in the Assessment and Plan were discussed with the patient.   All questions were answered. The patient expressed a clear understanding of the above. Follow-up [facility] in one year for repeat fibroscan.       MARYJO Esteban  [facility] [place] [facility]  [address] [facility], [address] [facility]   [address], [address] [facility]   [phone]